# Patient Record
Sex: FEMALE | Race: BLACK OR AFRICAN AMERICAN | NOT HISPANIC OR LATINO | URBAN - METROPOLITAN AREA
[De-identification: names, ages, dates, MRNs, and addresses within clinical notes are randomized per-mention and may not be internally consistent; named-entity substitution may affect disease eponyms.]

---

## 2024-05-20 ENCOUNTER — HOSPITAL ENCOUNTER (EMERGENCY)
Facility: HOSPITAL | Age: 24
Discharge: HOME | End: 2024-05-20
Attending: EMERGENCY MEDICINE
Payer: MEDICARE

## 2024-05-20 VITALS
OXYGEN SATURATION: 100 % | WEIGHT: 135 LBS | TEMPERATURE: 97.9 F | RESPIRATION RATE: 16 BRPM | SYSTOLIC BLOOD PRESSURE: 119 MMHG | DIASTOLIC BLOOD PRESSURE: 60 MMHG | HEART RATE: 99 BPM | HEIGHT: 64 IN | BODY MASS INDEX: 23.05 KG/M2

## 2024-05-20 DIAGNOSIS — R11.2 NAUSEA AND VOMITING, UNSPECIFIED VOMITING TYPE: Primary | ICD-10-CM

## 2024-05-20 LAB
ALBUMIN SERPL BCP-MCNC: 5 G/DL (ref 3.4–5)
ALP SERPL-CCNC: 73 U/L (ref 33–110)
ALT SERPL W P-5'-P-CCNC: 22 U/L (ref 7–45)
AMPHETAMINES UR QL SCN: NORMAL
AMPHETAMINES UR QL SCN: NORMAL
ANION GAP SERPL CALC-SCNC: 24 MMOL/L (ref 10–20)
APPEARANCE UR: CLEAR
AST SERPL W P-5'-P-CCNC: 27 U/L (ref 9–39)
B-HCG SERPL-ACNC: <2 MIU/ML
BARBITURATES UR QL SCN: NORMAL
BARBITURATES UR QL SCN: NORMAL
BASOPHILS # BLD AUTO: 0.04 X10*3/UL (ref 0–0.1)
BASOPHILS NFR BLD AUTO: 0.4 %
BENZODIAZ UR QL SCN: NORMAL
BENZODIAZ UR QL SCN: NORMAL
BILIRUB SERPL-MCNC: 0.4 MG/DL (ref 0–1.2)
BILIRUB UR STRIP.AUTO-MCNC: NEGATIVE MG/DL
BUN SERPL-MCNC: 17 MG/DL (ref 6–23)
BZE UR QL SCN: NORMAL
BZE UR QL SCN: NORMAL
CALCIUM SERPL-MCNC: 9.9 MG/DL (ref 8.6–10.3)
CANNABINOIDS UR QL SCN: NORMAL
CANNABINOIDS UR QL SCN: NORMAL
CHLORIDE SERPL-SCNC: 100 MMOL/L (ref 98–107)
CO2 SERPL-SCNC: 17 MMOL/L (ref 21–32)
COLOR UR: ABNORMAL
CREAT SERPL-MCNC: 1.04 MG/DL (ref 0.5–1.05)
EGFRCR SERPLBLD CKD-EPI 2021: 77 ML/MIN/1.73M*2
EOSINOPHIL # BLD AUTO: 0 X10*3/UL (ref 0–0.7)
EOSINOPHIL NFR BLD AUTO: 0 %
ERYTHROCYTE [DISTWIDTH] IN BLOOD BY AUTOMATED COUNT: 15.4 % (ref 11.5–14.5)
FENTANYL+NORFENTANYL UR QL SCN: NORMAL
FENTANYL+NORFENTANYL UR QL SCN: NORMAL
GLUCOSE BLD MANUAL STRIP-MCNC: 60 MG/DL (ref 74–99)
GLUCOSE SERPL-MCNC: 73 MG/DL (ref 74–99)
GLUCOSE UR STRIP.AUTO-MCNC: NORMAL MG/DL
HCT VFR BLD AUTO: 44.4 % (ref 36–46)
HGB BLD-MCNC: 13.7 G/DL (ref 12–16)
HOLD SPECIMEN: NORMAL
IMM GRANULOCYTES # BLD AUTO: 0.04 X10*3/UL (ref 0–0.7)
IMM GRANULOCYTES NFR BLD AUTO: 0.4 % (ref 0–0.9)
KETONES UR STRIP.AUTO-MCNC: ABNORMAL MG/DL
LACTATE SERPL-SCNC: 0.5 MMOL/L (ref 0.4–2)
LEUKOCYTE ESTERASE UR QL STRIP.AUTO: NEGATIVE
LIPASE SERPL-CCNC: 10 U/L (ref 9–82)
LYMPHOCYTES # BLD AUTO: 1.87 X10*3/UL (ref 1.2–4.8)
LYMPHOCYTES NFR BLD AUTO: 19.2 %
MCH RBC QN AUTO: 22.3 PG (ref 26–34)
MCHC RBC AUTO-ENTMCNC: 30.9 G/DL (ref 32–36)
MCV RBC AUTO: 72 FL (ref 80–100)
METHADONE UR QL SCN: NORMAL
METHADONE UR QL SCN: NORMAL
MONOCYTES # BLD AUTO: 0.66 X10*3/UL (ref 0.1–1)
MONOCYTES NFR BLD AUTO: 6.8 %
MUCOUS THREADS #/AREA URNS AUTO: NORMAL /LPF
NEUTROPHILS # BLD AUTO: 7.14 X10*3/UL (ref 1.2–7.7)
NEUTROPHILS NFR BLD AUTO: 73.2 %
NITRITE UR QL STRIP.AUTO: NEGATIVE
NRBC BLD-RTO: 0 /100 WBCS (ref 0–0)
OPIATES UR QL SCN: NORMAL
OPIATES UR QL SCN: NORMAL
OXYCODONE+OXYMORPHONE UR QL SCN: NORMAL
OXYCODONE+OXYMORPHONE UR QL SCN: NORMAL
PCP UR QL SCN: NORMAL
PCP UR QL SCN: NORMAL
PH UR STRIP.AUTO: 5.5 [PH]
PLATELET # BLD AUTO: 292 X10*3/UL (ref 150–450)
POTASSIUM SERPL-SCNC: 4 MMOL/L (ref 3.5–5.3)
PROT SERPL-MCNC: 8.8 G/DL (ref 6.4–8.2)
PROT UR STRIP.AUTO-MCNC: ABNORMAL MG/DL
RBC # BLD AUTO: 6.15 X10*6/UL (ref 4–5.2)
RBC # UR STRIP.AUTO: NEGATIVE /UL
RBC #/AREA URNS AUTO: NORMAL /HPF
SODIUM SERPL-SCNC: 137 MMOL/L (ref 136–145)
SP GR UR STRIP.AUTO: 1.03
SQUAMOUS #/AREA URNS AUTO: NORMAL /HPF
UROBILINOGEN UR STRIP.AUTO-MCNC: ABNORMAL MG/DL
WBC # BLD AUTO: 9.8 X10*3/UL (ref 4.4–11.3)
WBC #/AREA URNS AUTO: NORMAL /HPF

## 2024-05-20 PROCEDURE — 96361 HYDRATE IV INFUSION ADD-ON: CPT

## 2024-05-20 PROCEDURE — 85025 COMPLETE CBC W/AUTO DIFF WBC: CPT

## 2024-05-20 PROCEDURE — 2500000004 HC RX 250 GENERAL PHARMACY W/ HCPCS (ALT 636 FOR OP/ED)

## 2024-05-20 PROCEDURE — 82947 ASSAY GLUCOSE BLOOD QUANT: CPT | Mod: 59

## 2024-05-20 PROCEDURE — 80307 DRUG TEST PRSMV CHEM ANLYZR: CPT

## 2024-05-20 PROCEDURE — 2500000004 HC RX 250 GENERAL PHARMACY W/ HCPCS (ALT 636 FOR OP/ED): Performed by: EMERGENCY MEDICINE

## 2024-05-20 PROCEDURE — 96374 THER/PROPH/DIAG INJ IV PUSH: CPT

## 2024-05-20 PROCEDURE — 99284 EMERGENCY DEPT VISIT MOD MDM: CPT

## 2024-05-20 PROCEDURE — 96375 TX/PRO/DX INJ NEW DRUG ADDON: CPT

## 2024-05-20 PROCEDURE — 83690 ASSAY OF LIPASE: CPT

## 2024-05-20 PROCEDURE — 84702 CHORIONIC GONADOTROPIN TEST: CPT

## 2024-05-20 PROCEDURE — 81001 URINALYSIS AUTO W/SCOPE: CPT | Mod: 59

## 2024-05-20 PROCEDURE — 83605 ASSAY OF LACTIC ACID: CPT

## 2024-05-20 PROCEDURE — 80053 COMPREHEN METABOLIC PANEL: CPT

## 2024-05-20 PROCEDURE — 80307 DRUG TEST PRSMV CHEM ANLYZR: CPT | Mod: MUEWO,MUE | Performed by: EMERGENCY MEDICINE

## 2024-05-20 PROCEDURE — 36415 COLL VENOUS BLD VENIPUNCTURE: CPT

## 2024-05-20 RX ORDER — METOCLOPRAMIDE 10 MG/1
10 TABLET ORAL EVERY 8 HOURS PRN
Qty: 6 TABLET | Refills: 0 | Status: SHIPPED | OUTPATIENT
Start: 2024-05-20 | End: 2024-05-22

## 2024-05-20 RX ORDER — ONDANSETRON HYDROCHLORIDE 2 MG/ML
4 INJECTION, SOLUTION INTRAVENOUS ONCE
Status: COMPLETED | OUTPATIENT
Start: 2024-05-20 | End: 2024-05-20

## 2024-05-20 RX ORDER — METOCLOPRAMIDE HYDROCHLORIDE 5 MG/ML
10 INJECTION INTRAMUSCULAR; INTRAVENOUS ONCE
Status: COMPLETED | OUTPATIENT
Start: 2024-05-20 | End: 2024-05-20

## 2024-05-20 RX ADMIN — ONDANSETRON 4 MG: 2 INJECTION INTRAMUSCULAR; INTRAVENOUS at 05:21

## 2024-05-20 RX ADMIN — SODIUM CHLORIDE 1000 ML: 9 INJECTION, SOLUTION INTRAVENOUS at 05:20

## 2024-05-20 RX ADMIN — METOCLOPRAMIDE 10 MG: 5 INJECTION, SOLUTION INTRAMUSCULAR; INTRAVENOUS at 09:03

## 2024-05-20 ASSESSMENT — PAIN - FUNCTIONAL ASSESSMENT: PAIN_FUNCTIONAL_ASSESSMENT: 0-10

## 2024-05-20 ASSESSMENT — COLUMBIA-SUICIDE SEVERITY RATING SCALE - C-SSRS
2. HAVE YOU ACTUALLY HAD ANY THOUGHTS OF KILLING YOURSELF?: NO
6. HAVE YOU EVER DONE ANYTHING, STARTED TO DO ANYTHING, OR PREPARED TO DO ANYTHING TO END YOUR LIFE?: NO
1. IN THE PAST MONTH, HAVE YOU WISHED YOU WERE DEAD OR WISHED YOU COULD GO TO SLEEP AND NOT WAKE UP?: NO

## 2024-05-20 ASSESSMENT — PAIN SCALES - GENERAL: PAINLEVEL_OUTOF10: 0 - NO PAIN

## 2024-05-20 NOTE — ED PROVIDER NOTES
HPI   Chief Complaint   Patient presents with    Vomiting     Pt c/o vomiting that started on Friday. Pt denies diarrhea or abdominal pain       HPI  HISTORY OF PRESENT ILLNESS:  24 y.o. female presenting to the ED with complaint of intermittent vomiting for the past 3 to 4 days.  Patient states that she has had episodes of vomiting up to every 3-4 hours today.  Tends to be after she tries to eat.  She denies any abdominal pain or cramping.  Denies hematemesis.  No constipation or diarrhea, no melena or hematochezia.  No back or flank pain.  Denies urinary symptoms, no dysuria, hematuria, urinary urgency or frequency.  No fevers or chills.  She denies any drug or alcohol use, denies marijuana use.  She states that she has had the symptoms before, many years ago she was evaluated for them and was prescribed Zofran, she tried taking some of this old prescription but states that she vomited it up.  Denies chest pain or shortness of breath.  Denies chance of pregnancy.  No vaginal complaints.  No other symptoms or complaints voiced.    12 point review of systems was performed and is negative unless otherwise specified in HPI.    PMH: anxiety  Family history: noncontributory  Social history: non smoker, no ETOH, no illicit substances  Past Medical History:   Diagnosis Date    Allergy to peanuts 03/09/2016    History of peanut allergy    Bilateral inguinal hernia, without obstruction or gangrene, not specified as recurrent 09/10/2016    Bilateral inguinal hernia without obstruction or gangrene, recurrence not specified    Cutaneous abscess of groin 12/22/2016    Abscess of groin, left    Dysmenorrhea, unspecified 07/05/2016    Severe menstrual cramps    Enlarged lymph nodes, unspecified 02/22/2017    Reactive lymphadenopathy    Flat foot (pes planus) (acquired), unspecified foot 04/23/2015    Pes planus    Gastritis, unspecified, without bleeding 02/22/2017    Viral gastritis    Mild persistent asthma, uncomplicated  ([facility]) 04/19/2016    Mild persistent asthma without complication    Other diseases of vocal cords 07/05/2016    Vocal cord dysfunction    Other injury of other muscle(s) and tendon(s) at lower leg level, unspecified leg, initial encounter 04/23/2015    Medial tibial stress syndrome    Peroneal tendinitis, unspecified leg 04/23/2015    Peroneal tendonitis    Personal history of diseases of the skin and subcutaneous tissue 09/10/2016    History of sebaceous cyst    Personal history of other diseases of the nervous system and sense organs 05/06/2016    History of bacterial conjunctivitis    Posterior tibial tendinitis, unspecified leg 04/23/2015    Posterior tibial tendonitis    Short Achilles tendon (acquired), unspecified ankle 04/23/2015    Acquired short Achilles tendon    Unilateral inguinal hernia, without obstruction or gangrene, recurrent 09/10/2016    Unilateral recurrent inguinal hernia without obstruction or gangrene    Unspecified acute conjunctivitis, unspecified eye 10/06/2016    Acute bacterial conjunctivitis, unspecified laterality    Vulvar cyst 07/05/2016    Labial cyst         Abnormal Labs Reviewed - No abnormal labs to display   No orders to display               Michael Coma Scale Score: 15                     Patient History   Past Medical History:   Diagnosis Date    Allergy to peanuts 03/09/2016    History of peanut allergy    Bilateral inguinal hernia, without obstruction or gangrene, not specified as recurrent 09/10/2016    Bilateral inguinal hernia without obstruction or gangrene, recurrence not specified    Cutaneous abscess of groin 12/22/2016    Abscess of groin, left    Dysmenorrhea, unspecified 07/05/2016    Severe menstrual cramps    Enlarged lymph nodes, unspecified 02/22/2017    Reactive lymphadenopathy    Flat foot (pes planus) (acquired), unspecified foot 04/23/2015    Pes planus    Gastritis, unspecified, without bleeding 02/22/2017    Viral gastritis    Mild persistent asthma,  uncomplicated ([facility]) 04/19/2016    Mild persistent asthma without complication    Other diseases of vocal cords 07/05/2016    Vocal cord dysfunction    Other injury of other muscle(s) and tendon(s) at lower leg level, unspecified leg, initial encounter 04/23/2015    Medial tibial stress syndrome    Peroneal tendinitis, unspecified leg 04/23/2015    Peroneal tendonitis    Personal history of diseases of the skin and subcutaneous tissue 09/10/2016    History of sebaceous cyst    Personal history of other diseases of the nervous system and sense organs 05/06/2016    History of bacterial conjunctivitis    Posterior tibial tendinitis, unspecified leg 04/23/2015    Posterior tibial tendonitis    Short Achilles tendon (acquired), unspecified ankle 04/23/2015    Acquired short Achilles tendon    Unilateral inguinal hernia, without obstruction or gangrene, recurrent 09/10/2016    Unilateral recurrent inguinal hernia without obstruction or gangrene    Unspecified acute conjunctivitis, unspecified eye 10/06/2016    Acute bacterial conjunctivitis, unspecified laterality    Vulvar cyst 07/05/2016    Labial cyst     Past Surgical History:   Procedure Laterality Date    OTHER SURGICAL HISTORY  10/28/2021    Cyst excision    OTHER SURGICAL HISTORY  07/07/2021    Abscess incision and drainage     No family history on file.  Social History     Tobacco Use    Smoking status: Not on file    Smokeless tobacco: Not on file   Substance Use Topics    Alcohol use: Not on file    Drug use: Not on file       Physical Exam   ED Triage Vitals [05/20/24 0407]   Temperature Heart Rate Respirations BP   36.6 °C (97.9 °F) (!) 105 18 134/83      Pulse Ox Temp Source Heart Rate Source Patient Position   98 % Temporal -- --      BP Location FiO2 (%)     -- --       Physical Exam  Constitutional:       General: She is not in acute distress.     Appearance: She is not toxic-appearing.   HENT:      Head: Normocephalic and atraumatic.      Nose: No  congestion.      Mouth/Throat:      Mouth: Mucous membranes are moist.   Eyes:      General: No scleral icterus.     Extraocular Movements: Extraocular movements intact.      Pupils: Pupils are equal, round, and reactive to light.   Cardiovascular:      Rate and Rhythm: Normal rate and regular rhythm.      Pulses: Normal pulses.   Pulmonary:      Effort: Pulmonary effort is normal. No respiratory distress.      Breath sounds: Normal breath sounds.   Abdominal:      General: There is no distension.      Palpations: Abdomen is soft.      Tenderness: There is no abdominal tenderness. There is no right CVA tenderness, left CVA tenderness or guarding.   Musculoskeletal:         General: Normal range of motion.      Cervical back: Normal range of motion and neck supple. No rigidity.      Right lower leg: No edema.      Left lower leg: No edema.   Skin:     General: Skin is warm and dry.      Capillary Refill: Capillary refill takes less than 2 seconds.   Neurological:      General: No focal deficit present.      Mental Status: She is alert and oriented to person, place, and time.      Cranial Nerves: No cranial nerve deficit.      Gait: Gait normal.   Psychiatric:         Mood and Affect: Mood normal.         Behavior: Behavior normal.         Judgment: Judgment normal.         ED Course & MDM        Medical Decision Making  ED course / MDM     Summary:  Patient presented with nausea and vomiting for the past 3 to 4 days.  No other associated symptoms.  Mildly tachycardic in triage at 105, vital signs otherwise stable.  Patient is extremely well-appearing, ambulates unassisted, no acute distress.  Abdomen is entirely soft and nontender.  No CVA tenderness.  IV established, labs drawn.  Labs show no leukocytosis, normal hemoglobin.  Minor electrolyte abnormalities including glucose 73, bicarb 17 and anion gap 24, normal kidney and liver function.  Normal lipase.  Negative hCG. Patient given IV fluids and Zofran in the  ED.    Patient case signed out to oncoming ED attending Dr. Bang at 0600 due to shift change, pending remainder of labs, PO challenge, and final disposition.    This note has been transcribed using voice recognition and may contain grammatical errors, misplaced words, incorrect words, incorrect phrases or other errors.   Procedure  Procedures     Tonja Stevens PA-C  05/20/24 0605

## 2024-05-20 NOTE — DISCHARGE INSTRUCTIONS
Medications as needed.  Follow-up with your primary care doctor and GI.  Return immediately if concerning symptoms, as discussed.

## 2025-02-10 ENCOUNTER — OFFICE VISIT (OUTPATIENT)
Dept: URGENT CARE | Age: 25
End: 2025-02-10
Payer: MEDICARE

## 2025-02-10 VITALS
HEART RATE: 99 BPM | RESPIRATION RATE: 16 BRPM | SYSTOLIC BLOOD PRESSURE: 129 MMHG | DIASTOLIC BLOOD PRESSURE: 70 MMHG | TEMPERATURE: 98.5 F | OXYGEN SATURATION: 99 %

## 2025-02-10 DIAGNOSIS — R05.9 COUGH, UNSPECIFIED TYPE: Primary | ICD-10-CM

## 2025-02-10 LAB
POC RAPID INFLUENZA A: NEGATIVE
POC RAPID INFLUENZA B: NEGATIVE
POC SARS-COV-2 AG BINAX: NORMAL

## 2025-02-10 ASSESSMENT — ENCOUNTER SYMPTOMS
APPETITE CHANGE: 0
SHORTNESS OF BREATH: 0
FATIGUE: 0
SINUS PAIN: 0
ACTIVITY CHANGE: 0
CHEST TIGHTNESS: 0
SORE THROAT: 1
RHINORRHEA: 1
WHEEZING: 0
CHILLS: 0
VOICE CHANGE: 0
COUGH: 1
EYE PAIN: 0
FACIAL SWELLING: 0
HEADACHES: 1
TROUBLE SWALLOWING: 0
SINUS PRESSURE: 0
MYALGIAS: 0
ABDOMINAL PAIN: 0
DIZZINESS: 0
FEVER: 0
EYE DISCHARGE: 0

## 2025-02-10 NOTE — PROGRESS NOTES
Subjective   Patient ID: Kathy Nicole is a 24 y.o. female. 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 ).    History of Present Illness    History provided by:  Patient  Patient complains of headache and congestion x1 day. 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 Denies fever/chills/v/d.    Past Medical History  Allergies as of 02/10/2025    (No Known Allergies)       (Not in a hospital admission)       Past Medical History:   Diagnosis Date    Allergy to peanuts 03/09/2016    History of peanut allergy    Bilateral inguinal hernia, without obstruction or gangrene, not specified as recurrent 09/10/2016    Bilateral inguinal hernia without obstruction or gangrene, recurrence not specified    Cutaneous abscess of groin 12/22/2016    Abscess of groin, left    Dysmenorrhea, unspecified 07/05/2016    Severe menstrual cramps    Enlarged lymph nodes, unspecified 02/22/2017    Reactive lymphadenopathy    Flat foot (pes planus) (acquired), unspecified foot 04/23/2015    Pes planus    Gastritis, unspecified, without bleeding 02/22/2017    Viral gastritis    Mild persistent asthma, uncomplicated ([facility]) 04/19/2016    Mild persistent asthma without complication    Other diseases of vocal cords 07/05/2016    Vocal cord dysfunction    Other injury of other muscle(s) and tendon(s) at lower leg level, unspecified leg, initial encounter 04/23/2015    Medial tibial stress syndrome    Peroneal tendinitis, unspecified leg 04/23/2015    Peroneal tendonitis    Personal history of diseases of the skin and subcutaneous tissue 09/10/2016    History of sebaceous cyst    Personal history of other diseases of the nervous system and sense organs 05/06/2016    History of bacterial conjunctivitis    Posterior tibial tendinitis, unspecified leg 04/23/2015    Posterior tibial  tendonitis    Short Achilles tendon (acquired), unspecified ankle 04/23/2015    Acquired short Achilles tendon    Unilateral inguinal hernia, without obstruction or gangrene, recurrent 09/10/2016    Unilateral recurrent inguinal hernia without obstruction or gangrene    Unspecified acute conjunctivitis, unspecified eye 10/06/2016    Acute bacterial conjunctivitis, unspecified laterality    Vulvar cyst 07/05/2016    Labial cyst       Past Surgical History:   Procedure Laterality Date    OTHER SURGICAL HISTORY  10/28/2021    Cyst excision    OTHER SURGICAL HISTORY  07/07/2021    Abscess incision and drainage            Review of Systems  Review of Systems   Constitutional:  Negative for activity change, appetite change, chills, fatigue and fever.   HENT:  Positive for congestion, postnasal drip, rhinorrhea and sore throat. Negative for ear pain, facial swelling, mouth sores, sinus pressure, sinus pain, trouble swallowing and voice change.    Eyes:  Negative for pain and discharge.   Respiratory:  Positive for cough. Negative for chest tightness, shortness of breath and wheezing.    Cardiovascular:  Negative for chest pain.   Gastrointestinal:  Negative for abdominal pain.   Musculoskeletal:  Negative for myalgias.   Neurological:  Positive for headaches. Negative for dizziness and syncope.                                  Objective    There were no vitals filed for this visit.  No LMP recorded.    Physical Exam  Vitals reviewed.   Constitutional:       General: She is not in acute distress.     Appearance: Normal appearance.   HENT:      Right Ear: Tympanic membrane normal.      Left Ear: Tympanic membrane normal.      Nose: Congestion and rhinorrhea present.      Mouth/Throat:      Pharynx: Postnasal drip present. No oropharyngeal exudate or posterior oropharyngeal erythema.   Eyes:      Conjunctiva/sclera: Conjunctivae normal.   Cardiovascular:      Rate and Rhythm: Normal rate and regular rhythm.   Pulmonary:       Effort: Pulmonary effort is normal.      Breath sounds: Normal breath sounds.   Skin:     General: Skin is warm and dry.   Neurological:      General: No focal deficit present.      Mental Status: She is alert.         Procedures    Point of Care Test & Imaging Results from this visit  No results found for this visit on 02/10/25.   No results found.    Diagnostic study results (if any) were reviewed by HARJIT Simon.    Assessment/Plan   Allergies, medications, history, and pertinent labs/EKGs/Imaging reviewed by HARJIT Simon.     Medical Decision Making  The patient presents with symptoms consistent with viral illness. The patient denies red flag symptoms such as difficulty breathing, persistent high fever, chest pain, confusion, or inability to tolerate oral intake.  The patient was counseled on red flags, including worsening shortness of breath, persistent or worsening fever, chest pain, confusion, or signs of dehydration, and was advised to seek immediate care if these occur. Treatment includes supportive care with hydration, rest, and over-the-counter medications such as acetaminophen or ibuprofen for fever and body aches.     Orders and Diagnoses  There are no diagnoses linked to this encounter.    Medical Admin Record      Patient disposition: Home    Electronically signed by HARJIT Simon  4:11 PM